# Patient Record
Sex: MALE | Race: WHITE | NOT HISPANIC OR LATINO | URBAN - METROPOLITAN AREA
[De-identification: names, ages, dates, MRNs, and addresses within clinical notes are randomized per-mention and may not be internally consistent; named-entity substitution may affect disease eponyms.]

---

## 2022-07-05 ENCOUNTER — OFFICE VISIT (OUTPATIENT)
Dept: URGENT CARE | Facility: CLINIC | Age: 32
End: 2022-07-05

## 2022-07-05 VITALS
SYSTOLIC BLOOD PRESSURE: 125 MMHG | DIASTOLIC BLOOD PRESSURE: 83 MMHG | HEART RATE: 79 BPM | BODY MASS INDEX: 22.82 KG/M2 | WEIGHT: 163 LBS | TEMPERATURE: 97.9 F | RESPIRATION RATE: 20 BRPM | OXYGEN SATURATION: 96 % | HEIGHT: 71 IN
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
## 2022-07-05 PROCEDURE — G0382 LEV 3 HOSP TYPE B ED VISIT: HCPCS

## 2022-07-05 RX ORDER — TRIAMCINOLONE ACETONIDE 1 MG/G
CREAM TOPICAL 2 TIMES DAILY
Qty: 30 G | Refills: 0 | Status: SHIPPED | OUTPATIENT
Start: 2022-07-05

## 2022-07-05 NOTE — PROGRESS NOTES
[facility]        NAME: Lacey Schmid is a 33 y o  male  : 1990    MRN: [id number]  DATE: 2022  TIME: 3:22 PM    Assessment and Plan   Bug bite, initial encounter [W57  XXXA]  1  Bug bite, initial encounter  triamcinolone (KENALOG) 0 1 % cream    mupirocin (BACTROBAN) 2 % ointment         Patient Instructions   Insect bite site presentation and hx not concerning for cellulitis at this time  Prescribed topical Bactroban to prophylactically tx any superimposed superficial skin infection and also prescribed kenalog for sympomatic tx      Follow up with PCP in 2-3 days  Proceed to ER if symptoms worsen  Chief Complaint     Chief Complaint   Patient presents with    Insect Bite     Bug bites 5-6 days ago, mosquitos in back yard, right arm, right lower leg- neosporin          History of Present Illness     Pt is a 33year old male with no significant PMH presenting to the urgent care today c/o insect bites x 7 days  Pt states he believes they are all from mosquitos and that they have been very itchy but not painful since onset  He admits to itching them, and trying neosporin but no anti-itch OTC treatments or home remedies  He denies any systemic sx including f/c  He reports one bite on his knee form 7 days ago and 3 other bites on his R wrist from 4 days ago  Review of Systems   Review of Systems   Constitutional: Negative for chills, fatigue and fever  HENT: Negative for congestion, rhinorrhea, sinus pain and sore throat  Eyes: Negative for photophobia and visual disturbance  Respiratory: Negative for cough, chest tightness, shortness of breath and wheezing  Cardiovascular: Negative for chest pain, palpitations and leg swelling  Gastrointestinal: Negative for abdominal pain, diarrhea, nausea and vomiting  Genitourinary: Negative for dysuria and flank pain  Musculoskeletal: Negative for arthralgias  Skin: Positive for rash  Negative for pallor          Insect bite      Neurological: Negative for dizziness and headaches  Psychiatric/Behavioral: Negative for sleep disturbance  Current Medications       Current Outpatient Medications:     mupirocin (BACTROBAN) 2 % ointment, Apply topically 3 (three) times a day, Disp: 22 g, Rfl: 0    triamcinolone (KENALOG) 0 1 % cream, Apply topically 2 (two) times a day, Disp: 30 g, Rfl: 0    Current Allergies     Allergies as of 07/05/2022    (No Known Allergies)            The following portions of the patient's history were reviewed and updated as appropriate: allergies, current medications, past family history, past medical history, past social history, past surgical history and problem list      History reviewed  No pertinent past medical history  History reviewed  No pertinent surgical history  History reviewed  No pertinent family history  Medications have been verified  Objective   /83   Pulse 79   Temp 97 9 °F (36 6 °C)   Resp 20   Ht 5' 11" (1 803 m)   Wt 73 9 kg (163 lb)   SpO2 96%   BMI 22 73 kg/m²   No LMP for male patient  Physical Exam     Physical Exam  Vitals reviewed  Constitutional:       General: He is not in acute distress  Appearance: He is normal weight  He is not ill-appearing or toxic-appearing  HENT:      Head: Normocephalic  Right Ear: Tympanic membrane normal  No middle ear effusion  Tympanic membrane is not erythematous or bulging  Left Ear: Tympanic membrane normal   No middle ear effusion  Tympanic membrane is not erythematous or bulging  Nose: Nose normal       Right Sinus: No maxillary sinus tenderness or frontal sinus tenderness  Left Sinus: No maxillary sinus tenderness or frontal sinus tenderness  Mouth/Throat:      Mouth: Mucous membranes are moist       Pharynx: Uvula midline  No oropharyngeal exudate, posterior oropharyngeal erythema or uvula swelling  Tonsils: No tonsillar exudate or tonsillar abscesses     Eyes: Extraocular Movements: Extraocular movements intact  Conjunctiva/sclera: Conjunctivae normal       Pupils: Pupils are equal, round, and reactive to light  Cardiovascular:      Rate and Rhythm: Normal rate and regular rhythm  Pulses: Normal pulses  Heart sounds: Normal heart sounds  Pulmonary:      Effort: Pulmonary effort is normal  No tachypnea or respiratory distress  Breath sounds: Normal breath sounds and air entry  No decreased breath sounds, wheezing, rhonchi or rales  Abdominal:      General: Bowel sounds are normal       Palpations: Abdomen is soft  Tenderness: There is no abdominal tenderness  Musculoskeletal:         General: Normal range of motion  Cervical back: Normal range of motion and neck supple  Lymphadenopathy:      Cervical: No cervical adenopathy  Skin:     General: Skin is warm and dry  Capillary Refill: Capillary refill takes less than 2 seconds  Findings: Wound present  No erythema (Insect btie with mild surrounding erythema and scabbing), lesion or rash  Neurological:      General: No focal deficit present  Mental Status: He is alert  Cranial Nerves: Cranial nerves are intact  No cranial nerve deficit  Sensory: Sensation is intact  Motor: Motor function is intact  Coordination: Coordination is intact  Deep Tendon Reflexes: Reflexes are normal and symmetric

## 2022-07-05 NOTE — PATIENT INSTRUCTIONS
Insect Bite or Sting   Start steroid and antibacterial creams as directed  Follow up with PCP in 2-3 days  Proceed to ER if symptoms worsen  AMBULATORY CARE:   Most insect bites and stings  are not dangerous and go away without treatment  Common examples of insects that bite or sting are bees, ticks, mosquitoes, spiders, and ants  Insect bites or stings can lead to diseases such as malaria, West Nile virus, Lyme disease, or [place] Spotted Fever  Common signs and symptoms:   Mild symptoms  include a red bump, pain, swelling, and itching  Anaphylaxis symptoms  include throat tightness, trouble breathing, tingling, dizziness, and wheezing  Anaphylaxis is a sudden, life-threatening reaction that needs immediate treatment  Call your local emergency number (911 in the [address] [facility]) for signs or symptoms of anaphylaxis,  such as trouble breathing, swelling in your mouth or throat, or wheezing  You may also have itching, a rash, hives, or feel like you are going to faint  Seek care immediately if:   You are stung on your tongue or in your throat  A white area forms around the bite  You are sweating badly or have body pain  You think you were bitten or stung by a poisonous insect  Call your doctor if:   You have a fever  The area becomes red, warm, tender, and swollen beyond the area of the bite or sting  You have questions or concerns about your condition or care  Steps to take for signs or symptoms of anaphylaxis:   Immediately  give 1 shot of epinephrine only into the outer thigh muscle  Leave the shot in place  as directed  Your healthcare provider may recommend you leave it in place for up to 10 seconds before you remove it  This helps make sure all of the epinephrine is delivered  Call 911 and go to the emergency department,  even if the shot improved symptoms  Do not drive yourself  Bring the used epinephrine shot with you      Treatment  depends on how severe your symptoms are and if you had anaphylaxis before  You may need any of the following:  Antihistamines  decrease mild symptoms such as itching and rash  Epinephrine  is medicine used to treat severe allergic reactions such as anaphylaxis  A tetanus shot  may be given  The shot is a vaccine that helps prevent a bacterial infection  Tetanus booster shots are usually given every 10 years  If an insect bites or stings you:   Remove the stinger  Scrape the stinger out with your fingernail, edge of a credit card, or a knife blade  Do not squeeze the wound  Gently wash the area with soap and water  Remove the tick  Ticks must be removed as soon as possible so you do not get diseases passed through tick bites  Ask your healthcare provider for more information on tick bites and how to remove ticks  Care for your bite or sting wound:   Elevate (raise) the area above the level of your heart, if possible  Prop the area on pillows to keep it raised comfortably  Elevate the area for 10 to 20 minutes each hour or as directed by your healthcare provider  Apply compresses  Soak a clean washcloth in cold water, wring it out, and put it on the bite or sting  Use the compress for 10 to 20 minutes each hour or as directed by your healthcare provider  After 24 to 48 hours, change to warm compresses  Apply a baking soda paste  Add water to baking soda to make a thick paste  Put the paste on the area for 5 minutes  Rinse gently to remove the paste  Safety precautions to take if you are at risk for anaphylaxis:   Keep 2 shots of epinephrine with you at all times  You may need a second shot, because epinephrine only works for about 20 minutes and symptoms may return  Your healthcare provider can show you and family members how to give the shot  Check the expiration date every month and replace it before it expires  Create an action plan    Your healthcare provider can help you create a written plan that explains the allergy and an emergency plan to treat a reaction  The plan explains when to give a second epinephrine shot if symptoms return or do not improve after the first  Give copies of the action plan and emergency instructions to family members, work and school staff, and  providers  Show them how to give a shot of epinephrine  Carry medical alert identification  Wear medical alert jewelry or carry a card that says you have an insect allergy  Ask your healthcare provider where to get these items  Prevent an insect bite or sting:   Do not wear bright-colored or flower-print clothing when you plan to spend time outdoors  Do not use hairspray, perfumes, or aftershave  Do not leave food out  Empty any standing water  Wash containers with soap and water every 2 days  Put screens on all open windows and doors  Put insect repellent that contains DEET on skin that is showing when you go outside  Put insect repellent at the top of your boots, bottom of pant legs, and sleeve cuffs  Wear long sleeves, pants, and shoes  Use citronella candles outdoors to help keep mosquitoes away  Put a tick and flea collar on pets  Follow up with your doctor as directed:  Write down your questions so you remember to ask them during your visits  © Copyright [employer] 2022 Information is for End User's use only and may not be sold, redistributed or otherwise used for commercial purposes  All illustrations and images included in CareNotes® are the copyrighted property of [employer]  or [employer]  The above information is an  only  It is not intended as medical advice for individual conditions or treatments  Talk to your doctor, nurse or pharmacist before following any medical regimen to see if it is safe and effective for you

## 2022-09-29 ENCOUNTER — OFFICE VISIT (OUTPATIENT)
Dept: URGENT CARE | Facility: CLINIC | Age: 32
End: 2022-09-29

## 2022-09-29 VITALS
SYSTOLIC BLOOD PRESSURE: 112 MMHG | TEMPERATURE: 98.8 F | OXYGEN SATURATION: 98 % | HEART RATE: 72 BPM | DIASTOLIC BLOOD PRESSURE: 73 MMHG | RESPIRATION RATE: 16 BRPM

## 2022-09-29 DIAGNOSIS — H60.312 ACUTE DIFFUSE OTITIS EXTERNA OF LEFT EAR: ICD-10-CM

## 2022-09-29 DIAGNOSIS — H66.92 LEFT OTITIS MEDIA, UNSPECIFIED OTITIS MEDIA TYPE: Primary | ICD-10-CM

## 2022-09-29 PROCEDURE — G0382 LEV 3 HOSP TYPE B ED VISIT: HCPCS | Performed by: PHYSICIAN ASSISTANT

## 2022-09-29 RX ORDER — AZITHROMYCIN 250 MG/1
TABLET, FILM COATED ORAL
Qty: 6 TABLET | Refills: 0 | Status: SHIPPED | OUTPATIENT
Start: 2022-09-29 | End: 2022-10-03

## 2022-09-29 RX ORDER — NEOMYCIN SULFATE, POLYMYXIN B SULFATE AND HYDROCORTISONE 10; 3.5; 1 MG/ML; MG/ML; [USP'U]/ML
3 SUSPENSION/ DROPS AURICULAR (OTIC) 4 TIMES DAILY
Qty: 10 ML | Refills: 0 | Status: SHIPPED | OUTPATIENT
Start: 2022-09-29 | End: 2022-10-09

## 2022-09-29 NOTE — PROGRESS NOTES
[facility]        NAME: Erik Treviño is a 33 y o  male  : 1990    MRN: [id number]  DATE: 2022  TIME: 5:02 PM    /73   Pulse 72   Temp 98 8 °F (37 1 °C)   Resp 16   SpO2 98%     Assessment and Plan   Left otitis media, unspecified otitis media type [H66 92]  1  Left otitis media, unspecified otitis media type  azithromycin (ZITHROMAX) 250 mg tablet    neomycin-polymyxin-hydrocortisone (CORTISPORIN) 0 35%-10,000 units/mL-1% otic suspension   2  Acute diffuse otitis externa of left ear  azithromycin (ZITHROMAX) 250 mg tablet    neomycin-polymyxin-hydrocortisone (CORTISPORIN) 0 35%-10,000 units/mL-1% otic suspension         Patient Instructions       Follow up with PCP in 3-5 days  Proceed to  ER if symptoms worsen  Chief Complaint     Chief Complaint   Patient presents with    Earache     Pt reports hearing loss, pressure, and pain in left ear since yesterday  Had similar symptoms in right ear but they have since resolved  History of Present Illness       Pt with left ear pain and hearing loss for several day s      Review of Systems   Review of Systems   Constitutional: Negative  HENT: Positive for ear pain  Eyes: Negative  Respiratory: Negative  Cardiovascular: Negative  Gastrointestinal: Negative  Endocrine: Negative  Genitourinary: Negative  Musculoskeletal: Negative  Skin: Negative  Allergic/Immunologic: Negative  Neurological: Negative  Hematological: Negative  Psychiatric/Behavioral: Negative  All other systems reviewed and are negative          Current Medications       Current Outpatient Medications:     azithromycin (ZITHROMAX) 250 mg tablet, Take 2 tablets today then 1 tablet daily x 4 days, Disp: 6 tablet, Rfl: 0    neomycin-polymyxin-hydrocortisone (CORTISPORIN) 0 35%-10,000 units/mL-1% otic suspension, Administer 3 drops into the left ear 4 (four) times a day for 10 days, Disp: 10 mL, Rfl: 0   mupirocin (BACTROBAN) 2 % ointment, Apply topically 3 (three) times a day (Patient not taking: Reported on 9/29/2022), Disp: 22 g, Rfl: 0    triamcinolone (KENALOG) 0 1 % cream, Apply topically 2 (two) times a day (Patient not taking: Reported on 9/29/2022), Disp: 30 g, Rfl: 0    Current Allergies     Allergies as of 09/29/2022    (No Known Allergies)            The following portions of the patient's history were reviewed and updated as appropriate: allergies, current medications, past family history, past medical history, past social history, past surgical history and problem list      History reviewed  No pertinent past medical history  History reviewed  No pertinent surgical history  History reviewed  No pertinent family history  Medications have been verified  Objective   /73   Pulse 72   Temp 98 8 °F (37 1 °C)   Resp 16   SpO2 98%        Physical Exam     Physical Exam  Vitals and nursing note reviewed  Constitutional:       Appearance: Normal appearance  He is normal weight  HENT:      Head: Normocephalic and atraumatic  Right Ear: Tympanic membrane, ear canal and external ear normal       Left Ear: External ear normal       Ears:      Comments: Tm erythema  Cherry red and ear canal swelling and erythema,  No mastoid tenderness no tmj pain   Pharynx wnl no lymphadenopathy       Nose: Nose normal       Mouth/Throat:      Mouth: Mucous membranes are moist       Pharynx: Oropharynx is clear  Eyes:      Extraocular Movements: Extraocular movements intact  Conjunctiva/sclera: Conjunctivae normal       Pupils: Pupils are equal, round, and reactive to light  Cardiovascular:      Rate and Rhythm: Normal rate and regular rhythm  Pulses: Normal pulses  Heart sounds: Normal heart sounds  Pulmonary:      Effort: Pulmonary effort is normal       Breath sounds: Normal breath sounds  Abdominal:      General: Abdomen is flat   Bowel sounds are normal  Palpations: Abdomen is soft  Musculoskeletal:         General: Normal range of motion  Cervical back: Normal range of motion and neck supple  Skin:     General: Skin is warm  Capillary Refill: Capillary refill takes less than 2 seconds  Neurological:      General: No focal deficit present  Mental Status: He is alert and oriented to person, place, and time     Psychiatric:         Mood and Affect: Mood normal          Behavior: Behavior normal